# Patient Record
Sex: FEMALE | Race: ASIAN | NOT HISPANIC OR LATINO | Employment: PART TIME | URBAN - METROPOLITAN AREA
[De-identification: names, ages, dates, MRNs, and addresses within clinical notes are randomized per-mention and may not be internally consistent; named-entity substitution may affect disease eponyms.]

---
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
## 2024-05-24 ENCOUNTER — HOSPITAL ENCOUNTER (EMERGENCY)
Facility: HOSPITAL | Age: 19
Discharge: HOME | End: 2024-05-24
Admitting: PHYSICIAN ASSISTANT

## 2024-05-24 ENCOUNTER — APPOINTMENT (OUTPATIENT)
Dept: ULTRASOUND IMAGING | Facility: HOSPITAL | Age: 19
End: 2024-05-24
Attending: PHYSICIAN ASSISTANT

## 2024-05-24 VITALS
TEMPERATURE: 98.6 F | DIASTOLIC BLOOD PRESSURE: 68 MMHG | WEIGHT: 117 LBS | BODY MASS INDEX: 22.09 KG/M2 | SYSTOLIC BLOOD PRESSURE: 124 MMHG | OXYGEN SATURATION: 99 % | RESPIRATION RATE: 16 BRPM | HEIGHT: 61 IN | HEART RATE: 74 BPM

## 2024-05-24 DIAGNOSIS — R10.32 ABDOMINAL PAIN, LEFT LOWER QUADRANT: ICD-10-CM

## 2024-05-24 DIAGNOSIS — M54.50 ACUTE BILATERAL LOW BACK PAIN WITHOUT SCIATICA: ICD-10-CM

## 2024-05-24 LAB
ALBUMIN SERPL BCG-MCNC: 4.3 G/DL (ref 3.5–5.2)
ALBUMIN UR-MCNC: 20 MG/DL
ALP SERPL-CCNC: 60 U/L (ref 40–150)
ALT SERPL W P-5'-P-CCNC: 15 U/L (ref 0–50)
ANION GAP SERPL CALCULATED.3IONS-SCNC: 9 MMOL/L (ref 7–15)
APPEARANCE UR: ABNORMAL
AST SERPL W P-5'-P-CCNC: 16 U/L (ref 0–35)
BASOPHILS # BLD AUTO: 0 10E3/UL (ref 0–0.2)
BASOPHILS NFR BLD AUTO: 1 %
BILIRUB DIRECT SERPL-MCNC: <0.2 MG/DL (ref 0–0.3)
BILIRUB SERPL-MCNC: 0.5 MG/DL
BILIRUB UR QL STRIP: NEGATIVE
BUN SERPL-MCNC: 9.5 MG/DL (ref 6–20)
CALCIUM SERPL-MCNC: 8.9 MG/DL (ref 8.6–10)
CHLORIDE SERPL-SCNC: 107 MMOL/L (ref 98–107)
COLOR UR AUTO: YELLOW
CREAT SERPL-MCNC: 0.54 MG/DL (ref 0.51–0.95)
DEPRECATED HCO3 PLAS-SCNC: 24 MMOL/L (ref 22–29)
EGFRCR SERPLBLD CKD-EPI 2021: >90 ML/MIN/1.73M2
EOSINOPHIL # BLD AUTO: 0.1 10E3/UL (ref 0–0.7)
EOSINOPHIL NFR BLD AUTO: 1 %
ERYTHROCYTE [DISTWIDTH] IN BLOOD BY AUTOMATED COUNT: 12.8 % (ref 10–15)
GLUCOSE SERPL-MCNC: 95 MG/DL (ref 70–99)
GLUCOSE UR STRIP-MCNC: NEGATIVE MG/DL
HCG UR QL: NEGATIVE
HCT VFR BLD AUTO: 39.5 % (ref 35–47)
HGB BLD-MCNC: 13.1 G/DL (ref 11.7–15.7)
HGB UR QL STRIP: NEGATIVE
IMM GRANULOCYTES # BLD: 0 10E3/UL
IMM GRANULOCYTES NFR BLD: 0 %
KETONES UR STRIP-MCNC: NEGATIVE MG/DL
LEUKOCYTE ESTERASE UR QL STRIP: ABNORMAL
LYMPHOCYTES # BLD AUTO: 2.4 10E3/UL (ref 0.8–5.3)
LYMPHOCYTES NFR BLD AUTO: 39 %
MCH RBC QN AUTO: 28.2 PG (ref 26.5–33)
MCHC RBC AUTO-ENTMCNC: 33.2 G/DL (ref 31.5–36.5)
MCV RBC AUTO: 85 FL (ref 78–100)
MONOCYTES # BLD AUTO: 0.4 10E3/UL (ref 0–1.3)
MONOCYTES NFR BLD AUTO: 6 %
MUCOUS THREADS #/AREA URNS LPF: PRESENT /LPF
NEUTROPHILS # BLD AUTO: 3.4 10E3/UL (ref 1.6–8.3)
NEUTROPHILS NFR BLD AUTO: 53 %
NITRATE UR QL: NEGATIVE
NRBC # BLD AUTO: 0 10E3/UL
NRBC BLD AUTO-RTO: 0 /100
PH UR STRIP: 7 [PH] (ref 5–7)
PLATELET # BLD AUTO: 252 10E3/UL (ref 150–450)
POTASSIUM SERPL-SCNC: 3.9 MMOL/L (ref 3.4–5.3)
PROT SERPL-MCNC: 7.6 G/DL (ref 6.4–8.3)
RBC # BLD AUTO: 4.64 10E6/UL (ref 3.8–5.2)
RBC URINE: 0 /HPF
SODIUM SERPL-SCNC: 140 MMOL/L (ref 135–145)
SP GR UR STRIP: 1.03 (ref 1–1.03)
SQUAMOUS EPITHELIAL: 9 /HPF
UROBILINOGEN UR STRIP-MCNC: <2 MG/DL
WBC # BLD AUTO: 6.3 10E3/UL (ref 4–11)
WBC URINE: 7 /HPF

## 2024-05-24 PROCEDURE — 85025 COMPLETE CBC W/AUTO DIFF WBC: CPT | Performed by: PHYSICIAN ASSISTANT

## 2024-05-24 PROCEDURE — 96372 THER/PROPH/DIAG INJ SC/IM: CPT | Performed by: PHYSICIAN ASSISTANT

## 2024-05-24 PROCEDURE — 99285 EMERGENCY DEPT VISIT HI MDM: CPT | Mod: 25

## 2024-05-24 PROCEDURE — 76830 TRANSVAGINAL US NON-OB: CPT

## 2024-05-24 PROCEDURE — 80053 COMPREHEN METABOLIC PANEL: CPT | Performed by: PHYSICIAN ASSISTANT

## 2024-05-24 PROCEDURE — 36415 COLL VENOUS BLD VENIPUNCTURE: CPT | Performed by: PHYSICIAN ASSISTANT

## 2024-05-24 PROCEDURE — 81001 URINALYSIS AUTO W/SCOPE: CPT | Performed by: PHYSICIAN ASSISTANT

## 2024-05-24 PROCEDURE — 87086 URINE CULTURE/COLONY COUNT: CPT | Performed by: PHYSICIAN ASSISTANT

## 2024-05-24 PROCEDURE — 250N000011 HC RX IP 250 OP 636: Performed by: PHYSICIAN ASSISTANT

## 2024-05-24 PROCEDURE — 82248 BILIRUBIN DIRECT: CPT | Performed by: PHYSICIAN ASSISTANT

## 2024-05-24 PROCEDURE — 250N000013 HC RX MED GY IP 250 OP 250 PS 637: Performed by: PHYSICIAN ASSISTANT

## 2024-05-24 PROCEDURE — 76856 US EXAM PELVIC COMPLETE: CPT

## 2024-05-24 PROCEDURE — 81025 URINE PREGNANCY TEST: CPT | Performed by: PHYSICIAN ASSISTANT

## 2024-05-24 RX ORDER — KETOROLAC TROMETHAMINE 15 MG/ML
15 INJECTION, SOLUTION INTRAMUSCULAR; INTRAVENOUS ONCE
Status: COMPLETED | OUTPATIENT
Start: 2024-05-24 | End: 2024-05-24

## 2024-05-24 RX ORDER — ACETAMINOPHEN 325 MG/1
975 TABLET ORAL ONCE
Status: COMPLETED | OUTPATIENT
Start: 2024-05-24 | End: 2024-05-24

## 2024-05-24 RX ADMIN — ACETAMINOPHEN 975 MG: 325 TABLET ORAL at 10:57

## 2024-05-24 RX ADMIN — KETOROLAC TROMETHAMINE 15 MG: 15 INJECTION, SOLUTION INTRAMUSCULAR; INTRAVENOUS at 10:58

## 2024-05-24 ASSESSMENT — COLUMBIA-SUICIDE SEVERITY RATING SCALE - C-SSRS
2. HAVE YOU ACTUALLY HAD ANY THOUGHTS OF KILLING YOURSELF IN THE PAST MONTH?: NO
1. IN THE PAST MONTH, HAVE YOU WISHED YOU WERE DEAD OR WISHED YOU COULD GO TO SLEEP AND NOT WAKE UP?: NO
6. HAVE YOU EVER DONE ANYTHING, STARTED TO DO ANYTHING, OR PREPARED TO DO ANYTHING TO END YOUR LIFE?: NO

## 2024-05-24 ASSESSMENT — ACTIVITIES OF DAILY LIVING (ADL)
ADLS_ACUITY_SCORE: 35

## 2024-05-24 NOTE — DISCHARGE INSTRUCTIONS
I suspect her symptoms are related to her chronic regional pain syndrome and arthritis.  Unable to appreciate any other cause for your symptoms here today.  Your urine sample is questionable for infection.  As discussed, we will wait for the urine culture to determine whether or not treatment is actually needed.  You will be contacted by someone if treatment is warranted.  Use Tylenol and ibuprofen at home.  You can also consider ice and heat and topical analgesics.  For any new or worsening symptoms do not hesitate to return to the ER.

## 2024-05-24 NOTE — ED PROVIDER NOTES
EMERGENCY DEPARTMENT ENCOUNTER   NAME: Nora Santizo ; AGE: 18 year old female ; YOB: 2005 ; MRN: [id number] ; PCP: No Ref-Primary, Physician     Evaluation Date & Time: 5/24/2024 10:26 AM    ED Provider: Sarah Van PA-C    CHIEF COMPLAINT     Back Pain      FINAL ASSESSMENT       ICD-10-CM    1. Acute bilateral low back pain without sciatica  M54.50       2. Abdominal pain, left lower quadrant  R10.32           ED COURSE, MEDICAL DECISION MAKING, PLAN     ED course     10:41 AM: Evaluated patient. Performed physical exam. Plan for labs, meds, imaging.   12:39 PM: Rechecked and updated patient.  Discharge and follow-up plans discussed.  RN to discharge now.  ______________________________________________________________________    Nora Santizo is a 18 year old female with pertinent medical history of complex regional pain syndrome angiogram arthritis presenting for diffuse back pain from the middle down, left lower quadrant abdominal pain, and abnormal bruising to her lower extremities.  Symptoms started about a month ago, but recently worsened.    Exam reveals palpable left lower quadrant/pelvic pain.  She also has diffuse tenderness of the back from her scapulas down through the lumbar area.  This includes the midline spine as well as paraspinal musculature.  She has a couple of small bruises scattered about her lower extremities ranging from 3 mm to 2 cm.  Patient is neurologically intact.  Sensations intact.  Distal pulses intact.  She is ambulatory without difficulty.  Vitally normal.    Considered ovarian cyst, TOA, ectopic pregnancy, ovarian torsion, constipation, diverticulitis, discitis, cauda equina syndrome, myelopathy, discitis, flare of chronic regional pain syndrome, flare of her juvenile arthritis, leukemia or other malignancy.    Laboratory work appears unremarkable including CBC, BMP, hepatic panel.  UA shows a large amount of leukocyte esterase, small amount of  protein, some white blood cells, and squamous cells.  Culture has been submitted.  Urine hCG is negative.    Given the low left abdomen/pelvic pain, pelvic ultrasound was completed.  Radiologist read:Transabdominal scans were performed. Endovaginal ultrasound was attempted, though not well tolerated and full endovaginal exam was not performed. 1.  Overlying bowel gas mildly compromises exam. 2.  Right ovary not visualized. 3.  Otherwise exam grossly unremarkable.    Patient was given 15 mg of IM Toradol as well as 975 mg of Tylenol here.  On recheck she does report improved pain.    Overall, history and exam consistent with a flareup of her chronic regional pain syndrome and juvenile arthritis.  I did consider pelvic etiology such as ovarian cyst, TOA, ectopic pregnancy, torsion, however this seems less likely with unremarkable ultrasound results.  Her pain is also fairly minimal.  I did not feel that CT imaging was warranted with normal laboratory workup and fairly mild pain.  Low concern for diverticulitis, severe constipation, cauda equina syndrome, myelopathy, discitis, malignancy.  Certainly no red flag signs or symptoms present to suggest an acutely serious or life-threatening condition.    Plan will be to have patient treat with supportive measures at home.  Recommended alternating Tylenol and ibuprofen.  Alternate ice and heat.  Topical analgesics.  We also talked about presumptively treating for UTI versus waiting for culture results.  Patient would prefer to wait for culture results which I think is reasonable since she is not having any UTI-like symptoms.    Recommended that she follow-up with her primary care provider as well.    We discussed indications for reevaluation back in the ER.    Patient expresses understanding and is agreeable with the plan and will discharge now in good condition.    ______________________________________________________________________    *All pertinent lab & imaging studies  independently reviewed. (See chart for details)   *Discussed the results of all the tests and plan with patient and family/guardians.   *The patient and/or family/guardian acknowledged understanding and was agreeable with the care plan.      HISTORY OF PRESENT ILLNESS   Patient information was obtained from: Patient    Use of Intrepreter: N/A     Nora Santizo is a 18 year old female with a pertinent history of complex regional pain syndrome, juvenile arthritis who presents to the ED by means of walk-in for evaluation of middle and lower back pain as well as left lower abdomen/pelvic pain.  Symptoms have been ongoing for about a month, but worsened in the last day.    Patient also reports abnormal bruising on her legs recently.    She denies any urinary symptoms such as frequency, urgency, dysuria.  No vaginal symptoms such as discharge, itching, odor.  No nausea, vomiting, diarrhea.  No fevers or chills.  Denies any concern for pregnancy/STIs.    Patient states that she used to take meloxicam for the arthritis, but has stopped taking it because she does not feel it helps much.  She states she will occasionally use Tylenol and ibuprofen.    No other concerns.    Per my chart review  12/7/2023: [facility] for left upper quadrant abdominal pain and generalized bodyaches.  Diagnosed with likely gastritis/GERD.  Discharged with Zofran and encouraged to try Maalox and Tums.      MEDICAL HISTORY     Past Medical History:   Diagnosis Date    Complex regional pain syndrome of lower extremity     Constipation        Past Surgical History:   Procedure Laterality Date    DENTAL SURGERY      INJECT MAJOR JOINT / BURSA Right 12/15/2016    Procedure: INJECT MAJOR JOINT / BURSA;  Surgeon: Maine Akbar MD;  Location:  PEDS SEDATION     NO HISTORY OF SURGERY      STEROID INJECTION/NERVE BLOCK - HIM SCAN         Family History   Problem Relation Age of Onset    Arthritis Maternal Grandmother          "Uncertain whether osteoarthritis or rheumatoid arthritis    Tendonitis Mother     Other - See Comments Mother         Carpal tunnel    Growing Pains Unknown         Paternal cousin    Arthritis Unknown         Maternal second cousin with ? arthritis, uncertain of details    Cancer Unknown         Maternal second cousin with leukemia       Social History     Tobacco Use    Smoking status: Never    Smokeless tobacco: Never       Acetaminophen (TYLENOL PO)  cyclopentolate (CYCLOGYL) 1 % ophthalmic solution  DULoxetine (CYMBALTA) 20 MG capsule  meloxicam (MOBIC) 15 MG tablet  meloxicam (MOBIC) 7.5 MG tablet  tobramycin-dexamethasone (TOBRADEX) 0.3-0.1 % ophthalmic suspension          PHYSICAL EXAM     First Vitals:  Patient Vitals for the past 24 hrs:   BP Temp Temp src Pulse Resp SpO2 Height Weight   05/24/24 1021 135/79 98.6  F (37  C) Temporal 82 16 97 % 1.549 m (5' 1\") 53.1 kg (117 lb)         PHYSICAL EXAM:   Constitutional: No acute distress.  Neuro: Awake and alert. No focal deficits.  Psych: Calm and cooperative.  Eyes: PERRL. EOMI. Conjunctivae clear.    Mouth: Pink and moist.    Neck: FROM. No pain over the cervical spine or paraspinal musculature.   Cardio: Regular rate. Adequate perfusion to extremities. Regular rhythm. No murmurs.  Pulmonary: Oxygenating well on RA. No labored breathing. CTA b/l.  Abdomen: Left lower abdomen/pelvic pain with palpation.  BS present. Soft and non-distended.  No rebound or guarding.  Back: Diffuse palpable pain from the lower scapulas and down through the lumbar region.  This includes the midline spine as well as paraspinal musculature.  Appears to move freely. No CVA tenderness.   Upper extremities: Moves freely. No edema. Distal pulses intact. Sensations intact.   Lower extremities: Moves freely. No edema. Distal pulses intact. Sensations intact.   Skin: 6 or 7 small bruises to the lower extremities ranging in size from 3 mm to 2 cm.  Skin is otherwise natural color, warm, " dry, intact.       RESULTS     LAB:  All pertinent labs reviewed and interpreted  Labs Ordered and Resulted from Time of ED Arrival to Time of ED Departure   ROUTINE UA WITH MICROSCOPIC REFLEX TO CULTURE - Abnormal       Result Value    Color Urine Yellow      Appearance Urine Turbid (*)     Glucose Urine Negative      Bilirubin Urine Negative      Ketones Urine Negative      Specific Gravity Urine 1.029      Blood Urine Negative      pH Urine 7.0      Protein Albumin Urine 20 (*)     Urobilinogen Urine <2.0      Nitrite Urine Negative      Leukocyte Esterase Urine 500 Tulio/uL (*)     Mucus Urine Present (*)     RBC Urine 0      WBC Urine 7 (*)     Squamous Epithelials Urine 9 (*)    BASIC METABOLIC PANEL - Normal    Sodium 140      Potassium 3.9      Chloride 107      Carbon Dioxide (CO2) 24      Anion Gap 9      Urea Nitrogen 9.5      Creatinine 0.54      GFR Estimate >90      Calcium 8.9      Glucose 95     HEPATIC FUNCTION PANEL - Normal    Protein Total 7.6      Albumin 4.3      Bilirubin Total 0.5      Alkaline Phosphatase 60      AST 16      ALT 15      Bilirubin Direct <0.20     HCG QUALITATIVE URINE - Normal    hCG Urine Qualitative Negative     CBC WITH PLATELETS AND DIFFERENTIAL    WBC Count 6.3      RBC Count 4.64      Hemoglobin 13.1      Hematocrit 39.5      MCV 85      MCH 28.2      MCHC 33.2      RDW 12.8      Platelet Count 252      % Neutrophils 53      % Lymphocytes 39      % Monocytes 6      % Eosinophils 1      % Basophils 1      % Immature Granulocytes 0      NRBCs per 100 WBC 0      Absolute Neutrophils 3.4      Absolute Lymphocytes 2.4      Absolute Monocytes 0.4      Absolute Eosinophils 0.1      Absolute Basophils 0.0      Absolute Immature Granulocytes 0.0      Absolute NRBCs 0.0     URINE CULTURE       RADIOLOGY:  US Pelvic Complete with Transvaginal   Final Result   IMPRESSION:      1.  Overlying bowel gas mildly compromises exam.      2.  Right ovary not visualized.      3.  Otherwise  exam grossly unremarkable.             ECG:    N/A      PROCEDURES     None         MEDICAL DECISION MAKING:  Obtained supplemental history:Supplemental history obtained?: No  Reviewed external records: External records reviewed?: Outpatient Record: See HPI  Care impacted by chronic illness:Chronic Pain and Other: Juvenile arthritis  Care significantly affected by social determinants of health:N/A  Did you consider but not order tests?: Work up considered but not performed and documented in chart, if applicable  Did you interpret images independently?: Independent interpretation of ECG and images noted in documentation, when applicable.  Consultation discussion with other provider:Did you involve another provider (consultant, , pharmacy, etc.)?: No  Discharge. No recommendations on prescription strength medication(s). See documentation for any additional details.      FINAL IMPRESSION:    ICD-10-CM    1. Acute bilateral low back pain without sciatica  M54.50       2. Abdominal pain, left lower quadrant  R10.32             MEDICATIONS GIVEN IN THE EMERGENCY DEPARTMENT:  Medications   ketorolac (TORADOL) injection 15 mg (15 mg Intramuscular $Given 5/24/24 1058)   acetaminophen (TYLENOL) tablet 975 mg (975 mg Oral $Given 5/24/24 1057)         NEW PRESCRIPTIONS STARTED AT TODAY'S ED VISIT:  New Prescriptions    No medications on file              Some or all of this documentation has been completed using dictation software and mild grammatical errors may be present. Please contact me with any concerns regarding this.       Sarah Van PA-C  Emergency Medicine   [facility] EMERGENCY DEPARTMENT       Sarah Van PA-C  05/24/24 8325

## 2024-05-24 NOTE — ED TRIAGE NOTES
Patient arrives from home with mid-lower back pain. Reports history of arthritis in back and was taking daily Meloxicam and another medication for the pain but stopped taking them a month ago because they were not helping.     Triage Assessment (Adult)       Row Name 05/24/24 1022          Triage Assessment    Airway WDL WDL        Respiratory WDL    Respiratory WDL WDL        Skin Circulation/Temperature WDL    Skin Circulation/Temperature WDL WDL        Cardiac WDL    Cardiac WDL WDL        Peripheral/Neurovascular WDL    Peripheral Neurovascular WDL WDL

## 2024-05-25 LAB — BACTERIA UR CULT: NORMAL

## 2024-06-27 ENCOUNTER — HOSPITAL ENCOUNTER (EMERGENCY)
Facility: HOSPITAL | Age: 19
Discharge: HOME | End: 2024-06-27
Attending: EMERGENCY MEDICINE | Admitting: EMERGENCY MEDICINE
Payer: OTHER MISCELLANEOUS

## 2024-06-27 ENCOUNTER — APPOINTMENT (OUTPATIENT)
Dept: RADIOLOGY | Facility: HOSPITAL | Age: 19
End: 2024-06-27
Payer: OTHER MISCELLANEOUS

## 2024-06-27 VITALS
TEMPERATURE: 97 F | RESPIRATION RATE: 12 BRPM | HEART RATE: 81 BPM | DIASTOLIC BLOOD PRESSURE: 75 MMHG | SYSTOLIC BLOOD PRESSURE: 110 MMHG | HEIGHT: 61 IN | WEIGHT: 116.84 LBS | OXYGEN SATURATION: 99 % | BODY MASS INDEX: 22.06 KG/M2

## 2024-06-27 DIAGNOSIS — M25.531 RIGHT WRIST PAIN: ICD-10-CM

## 2024-06-27 PROCEDURE — 250N000013 HC RX MED GY IP 250 OP 250 PS 637

## 2024-06-27 PROCEDURE — 73130 X-RAY EXAM OF HAND: CPT | Mod: RT

## 2024-06-27 PROCEDURE — 73030 X-RAY EXAM OF SHOULDER: CPT | Mod: RT

## 2024-06-27 PROCEDURE — 73080 X-RAY EXAM OF ELBOW: CPT | Mod: RT

## 2024-06-27 PROCEDURE — 73090 X-RAY EXAM OF FOREARM: CPT | Mod: RT

## 2024-06-27 PROCEDURE — 73060 X-RAY EXAM OF HUMERUS: CPT | Mod: RT

## 2024-06-27 PROCEDURE — 99283 EMERGENCY DEPT VISIT LOW MDM: CPT

## 2024-06-27 RX ORDER — ACETAMINOPHEN 325 MG/1
650 TABLET ORAL ONCE
Status: COMPLETED | OUTPATIENT
Start: 2024-06-27 | End: 2024-06-27

## 2024-06-27 RX ADMIN — ACETAMINOPHEN 650 MG: 325 TABLET ORAL at 11:00

## 2024-06-27 ASSESSMENT — ACTIVITIES OF DAILY LIVING (ADL)
ADLS_ACUITY_SCORE: 35
ADLS_ACUITY_SCORE: 35

## 2024-06-27 ASSESSMENT — COLUMBIA-SUICIDE SEVERITY RATING SCALE - C-SSRS
1. IN THE PAST MONTH, HAVE YOU WISHED YOU WERE DEAD OR WISHED YOU COULD GO TO SLEEP AND NOT WAKE UP?: NO
2. HAVE YOU ACTUALLY HAD ANY THOUGHTS OF KILLING YOURSELF IN THE PAST MONTH?: NO
6. HAVE YOU EVER DONE ANYTHING, STARTED TO DO ANYTHING, OR PREPARED TO DO ANYTHING TO END YOUR LIFE?: NO

## 2024-06-27 NOTE — Clinical Note
Nora Santizo was seen and treated in our emergency department on 6/27/2024.  She may return to work on 07/02/2024.       If you have any questions or concerns, please don't hesitate to call.      Lois Mitchell MD
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
## 2024-06-27 NOTE — DISCHARGE INSTRUCTIONS
Rest.  Orally hydrate. Ice your wrist, elevate your wrist and wear your wrist brace for support.  Return to the ED if new symptoms develop or symptoms worsen.  You have been referred to a primary care doctor to establish care.  I recommend that you follow-up with [facility] to discuss your wrist injury.

## 2024-07-15 ENCOUNTER — TRANSCRIBE ORDERS (OUTPATIENT)
Dept: OTHER | Age: 19
End: 2024-07-15

## 2024-07-15 DIAGNOSIS — M54.12 CERVICAL RADICULOPATHY, ACUTE: Primary | ICD-10-CM
